# Patient Record
Sex: FEMALE | Race: WHITE | NOT HISPANIC OR LATINO | URBAN - METROPOLITAN AREA
[De-identification: names, ages, dates, MRNs, and addresses within clinical notes are randomized per-mention and may not be internally consistent; named-entity substitution may affect disease eponyms.]

---

## 2024-09-10 ENCOUNTER — OFFICE VISIT (OUTPATIENT)
Dept: URGENT CARE | Age: 49
End: 2024-09-10
Payer: COMMERCIAL

## 2024-09-10 VITALS
HEIGHT: 64 IN | DIASTOLIC BLOOD PRESSURE: 73 MMHG | OXYGEN SATURATION: 97 % | HEART RATE: 76 BPM | BODY MASS INDEX: 35 KG/M2 | RESPIRATION RATE: 20 BRPM | WEIGHT: 205 LBS | TEMPERATURE: 97.1 F | SYSTOLIC BLOOD PRESSURE: 172 MMHG

## 2024-09-10 DIAGNOSIS — K04.7 DENTAL ABSCESS: Primary | ICD-10-CM

## 2024-09-10 RX ORDER — AMOXICILLIN 875 MG/1
875 TABLET, FILM COATED ORAL 2 TIMES DAILY
Qty: 20 TABLET | Refills: 0 | Status: SHIPPED | OUTPATIENT
Start: 2024-09-10 | End: 2024-09-20

## 2024-09-10 NOTE — PROGRESS NOTES
"Subjective   Patient ID: Jolene Hernandez is a 49 y.o. female. They present today with a chief complaint of Other (Tooth pain ).    History of Present Illness  HPI  49 yr old female presents with dental pain for 3 days she has an appt with a dentist on Thursday but needs abx before her visit.  She has been taking motrin at home for pain.  Past Medical History  Allergies as of 09/10/2024    (Not on File)       (Not in a hospital admission)       Past Medical History:   Diagnosis Date    Cutaneous abscess of limb, unspecified 04/10/2018    Abscess of leg       Past Surgical History:   Procedure Laterality Date    CHOLECYSTECTOMY  04/10/2018    Cholecystectomy Laparoscopic            Review of Systems  Review of Systems   CONSTITUTIONAL: No for fever, no chills, no recent weight loss  EYES: No pain, No redness, No swelling  EARS: No discharge, No pain, No hearing loss  NOSE: No congestion, No discharge, No bleeding  MOUTH: No throat pain, No hoarseness + dental pain  NECK: No pain, No stiffness, No swollen glands  CARDIOVASCULAR: No chest pain, No edema, No irregular rhythm, No palpitations  RESPIRATORY: No cough, No dyspnea  GI: No abdominal pain, No constipation or diarrhea, No N/V  : No urgency, No dysuria, No increase or decrease in urine output, No hematuria  MUSCULOSKELETAL: No back pain, No joint pain, No swelling, No weakness  SKIN: No rash, No lesions, No abrasions  NEURO: No dizziness, No alteration in mentation, No headache, No loss of consciousness, No ataxia  PSYCH: No anxiety, No depression, No SI or HI                     Objective    Vitals:    09/10/24 1114   BP: 172/73   BP Location: Left arm   Patient Position: Sitting   BP Cuff Size: Large adult   Pulse: 76   Resp: 20   Temp: 36.2 °C (97.1 °F)   TempSrc: Temporal   SpO2: 97%   Weight: 93 kg (205 lb)   Height: 1.626 m (5' 4\")     No LMP recorded.    Physical Exam  Gen.: Vitals noted no distress afebrile. Normal phonation, no stridor, no trismus. "   ENT: TMs clear bilaterally, EACs unremarkable. Mastoids nontender. Posterior oropharynx without erythema, exudate, or swelling. Uvula is in the midline and nonedematous. No Beata's Angina.  + dental pain with fractured tooth upper anterior molar on right  Neck: Supple. No meningismus through full range of motion. No lymphadenopathy.   Cardiac: Regular rate rhythm no murmur.   Lungs: Good aeration throughout. No adventitious breath sounds.   Abdomen: Soft nontender nonsurgical throughout. Normoactive bowel sounds.   Extremities: No peripheral edema, negative Homans bilaterally no cords.   Skin: No rash.   Neuro: No focal neurologic deficits. NIH score is 0.       Procedures    Point of Care Test & Imaging Results from this visit  Results for orders placed or performed in visit on 05/05/23   Group A Streptococcus, Culture    Specimen: Respiratory   Result Value Ref Range    Specimen Description       THROAT Performed at [address] [address]    Special Requests       NONE Performed at [address] [address]    RESULTS       NEGATIVE FOR GROUP A BETA STREP  Performed at [address] [address]      REPORT STATUS -[facility] DATA CONVERSION FINAL 05/07/2023      No results found.    Diagnostic study results (if any) were reviewed by LEXIS Lebron.    Assessment/Plan   Allergies, medications, history, and pertinent labs/EKGs/Imaging reviewed by LEXIS Lebron.     Medical Decision Making  History and physical is consistent with dental abscess upper molar.  No signs of sepsis, no beata angina, no strep.  Pt was given abx and will follow up with dentist    Orders and Diagnoses  There are no diagnoses linked to this encounter.    Medical Admin Record      Follow Up Instructions  No follow-ups on file.Take the abx as prescribed along with motrin or tylenol and see the dentist as scheduled     Patient disposition:  Home    Electronically signed by DI Lebron-CNP  11:36 AM